# Patient Record
(demographics unavailable — no encounter records)

---

## 2025-05-20 NOTE — HISTORY OF PRESENT ILLNESS
[de-identified] : This is a 51 year old F with PMH of obesity, anxiety, depression, JOHN referred by PCP for hematological evaluation of anemia.  Endorses heavy, prolonged menses for the past 6 months. Pt has presented to the ER several times for the bleeding. Saw Gyn, Dr. Singh, a few months ago and had an EMB which was reportedly normal. States she also had a pelvic US revealing fibroids. D&C was recommended but she has not scheduled it yet.   Endorses significant fatigue. Denies GI//mucosal bleeding, easy bruising, tarry stools, fever/chills, night sweats, unexplained weight loss, loss of appetite, palpable adenopathy. Admits to GERD like sx's at times. Denies history of blood transfusions or iron infusions. She started taking oral Iron and Vit D a few weeks ago. History of JOHN but does not use the C-PAP. States she had a normal colonoscopy ~ 5years ago. No family history of colon cancer or hematological malignancies.   4/30/25- WBC 7.9, Hgb 10.7, Hct 33.4, MCV 83.5, , Iron 21L, Iron Sat 5%, TIBC 404, Ferritin 4L, X86=[id number], Folate 10.3, Vit D 20L, HgbA1C 5.6, TSH 2.03, CMP wnl   HCM: GYN-2025 Mammo-2023 Colonoscopy-within 5 years, reports negative

## 2025-05-20 NOTE — ASSESSMENT
[FreeTextEntry1] : This is a 51 year old F with PMH of DM, obesity referred by PCP for hematological evaluation of anemia.  #Iron deficiency anemia/Low normal Vit B12  -Anemia is clearly reflective of iron deficiency as iron studies and ferritin are typical of decreased iron stores. -Start B12 supplement  -Plan is for parenteral iron as a means of rapid replacement. Intravenous iron will be administered between 2-5 doses, depending on insurance approval.  -Potential side effects including allergy and anaphylaxis, headache, taste changes, nausea and skin staining were discussed, and she has agreed to proceed with treatment. Consent signed.  -We discussed consumption of high iron foods including occasional consumption of lean red meat, nuts, leafy green vegetables and patient will try to incorporate these into their diet.  -Patient has been counseled to make an appointment with gastroenterology to discuss a work-up for possible GI bleeding. -GYN follow up. Encouraged pt to proceed with D&C.  -Encouraged pt to have her mammogram done  -RTO 2 months after last infusion or sooner if no improvement. Consider celiac testing if anemia persists.

## 2025-05-20 NOTE — HISTORY OF PRESENT ILLNESS
[de-identified] : This is a 51 year old F with PMH of obesity, anxiety, depression, JOHN referred by PCP for hematological evaluation of anemia.  Endorses heavy, prolonged menses for the past 6 months. Pt has presented to the ER several times for the bleeding. Saw Gyn, Dr. Singh, a few months ago and had an EMB which was reportedly normal. States she also had a pelvic US revealing fibroids. D&C was recommended but she has not scheduled it yet.   Endorses significant fatigue. Denies GI//mucosal bleeding, easy bruising, tarry stools, fever/chills, night sweats, unexplained weight loss, loss of appetite, palpable adenopathy. Admits to GERD like sx's at times. Denies history of blood transfusions or iron infusions. She started taking oral Iron and Vit D a few weeks ago. History of JOHN but does not use the C-PAP. States she had a normal colonoscopy ~ 5years ago. No family history of colon cancer or hematological malignancies.   4/30/25- WBC 7.9, Hgb 10.7, Hct 33.4, MCV 83.5, , Iron 21L, Iron Sat 5%, TIBC 404, Ferritin 4L, [id number], Folate 10.3, Vit D 20L, HgbA1C 5.6, TSH 2.03, CMP wnl   HCM: GYN-2025 Mammo-2023 Colonoscopy-within 5 years, reports negative